# Patient Record
Sex: FEMALE | Race: WHITE | Employment: FULL TIME | URBAN - METROPOLITAN AREA
[De-identification: names, ages, dates, MRNs, and addresses within clinical notes are randomized per-mention and may not be internally consistent; named-entity substitution may affect disease eponyms.]

---

## 2018-06-25 ENCOUNTER — OFFICE VISIT (OUTPATIENT)
Dept: URGENT CARE | Facility: URGENT CARE | Age: 38
End: 2018-06-25
Payer: COMMERCIAL

## 2018-06-25 VITALS
TEMPERATURE: 98.2 F | SYSTOLIC BLOOD PRESSURE: 116 MMHG | HEART RATE: 58 BPM | OXYGEN SATURATION: 99 % | DIASTOLIC BLOOD PRESSURE: 81 MMHG

## 2018-06-25 DIAGNOSIS — L50.3 URTICARIA, DERMATOGRAPHIC: Primary | ICD-10-CM

## 2018-06-25 DIAGNOSIS — Z88.9 DRUG ALLERGY: ICD-10-CM

## 2018-06-25 PROCEDURE — 99203 OFFICE O/P NEW LOW 30 MIN: CPT | Performed by: FAMILY MEDICINE

## 2018-06-25 RX ORDER — PREDNISONE 20 MG/1
40 TABLET ORAL DAILY
Qty: 14 TABLET | Refills: 0 | Status: SHIPPED | OUTPATIENT
Start: 2018-06-25 | End: 2018-07-02

## 2018-06-25 NOTE — PROGRESS NOTES
SUBJECTIVE:  Deborah Wiseman is a 37 year old female who presents to the clinic today for a rash.  Onset of rash was 3 days ago.   Rash is worsening.  Location of the rash: neck, ears and arms and legs.  Quality/symptoms of rash: itching and red   Symptoms are mild and moderate and rash seems to be worsening.  Previous history of a similar rash? Yes: had a similar rash on her legs noticed about 2 weeks ago after shaving but it was localized to her legs only.  Went for a consult on 6-20-18 and started on Keflex for folliculitis.  Felt like the leg rash was improving only to spread in the past few days.   Recent exposure history: Keflex started 6-2-18    Associated symptoms include: nothing.  Has been taking 2 daily Zyrtec but not better.     Past Medical History:   Diagnosis Date     Abnormal Pap smear 4 yrs ago      Current Outpatient Prescriptions   Medication Sig Dispense Refill     Prenatal Vit w/Mm-Tesavjjaj-BT (PNV PO) Take 1 tablet by mouth daily.       Social History   Substance Use Topics     Smoking status: Never Smoker     Smokeless tobacco: Never Used     Alcohol use No       ROS:  Review of systems negative except as stated above.    EXAM:   /81  Pulse 58  Temp 98.2  F (36.8  C) (Oral)  SpO2 99%  Breastfeeding? No  GENERAL: alert, no acute distress.  SKIN: Rash description:    Distribution: generalized  Location: ears, neck, arms and legs, dermatographism noted on abdomen    Color: red,  Lesion type: maculopapular, small wheals, scattered discrete lesions with no other findings  HENT: ear canals and TM's normal.  Nose and mouth without ulcers, erythema or lesions  NECK: supple, non-tender to palpation, no adenopathy noted  RESP: lungs clear to auscultation - no rales, rhonchi or wheezes  CV: regular rates and rhythm, normal S1 S2, no murmur noted    ASSESSMENT:  Urticaria, dermatographic  Drug allergy    PLAN:  1) See today's orders.  Recommend increase Zyrtec to 20 mg daily and use Benadryl 1-2  at night prn itching/rash.   2)  She is to stop the Keflex as I believe is the etiology of the urticaria.  3) Follow-up with primary clinic if not improving in the next few days or sooner if worsening.    Marilee Porter MD

## 2018-06-25 NOTE — MR AVS SNAPSHOT
After Visit Summary   6/25/2018    Deborah Wiseman    MRN: [id number]           Patient Information     Date Of Birth          1980        Visit Information        Provider Department      6/25/2018 5:15 PM Marilee Fisher MD [facility]        Today's Diagnoses     Urticaria, dermatographic    -  1      Care Instructions      Hives (Adult)  Hives are pink or red bumps on the skin. These bumps are also known as wheals. The bumps can itch, burn, or sting. Hives can occur anywhere on the body. They vary in size and shape and can form in clusters. Individual hives can appear and go away quickly. New hives may develop as old ones fade. Hives are common and usually harmless. Occasionally hives are a sign of a serious allergy.  Hives are often caused by an allergic reaction. It may be an allergic reaction to foods such as fruit, shellfish, chocolate, nuts, or tomatoes. It may be a reaction to pollens, animal fur, or mold spores. Medicines, chemicals, and insect bites can also cause hives. And hives can be caused by hot sun or cold air. The cause of hives can be difficult to find.  You may be given medicines to relieve swelling and itching. Follow all instructions when using these medicines. The hives will usually fade in a few days, but can last up to 2 weeks.  Home care  Follow these tips:    Try to find the cause of the hives and eliminate it. Discuss possible causes with your healthcare provider. Future reactions to the same allergen may be worse.    Don t scratch the hives. Scratching will delay healing. To reduce itching, apply cool, wet compresses to the skin.    Dress in soft, loose cotton clothing.    Don t bathe in hot water. This can make the itching worse.    Apply an ice pack or cool pack wrapped in a thin towel to your skin. This will help reduce redness and itching. But if your hives were caused by exposure to cold, then do not apply more cold to  them.    You may use over-the counter antihistamines to reduce itching. Some older antihistamines, such as diphenhydramine and chlorpheniramine, are inexpensive. But they need to be taken often and may make you sleepy. They are best used at bedtime. Don t use diphenhydramine if you have glaucoma or have trouble urinating because of an enlarged prostate. Newer antihistamines, such as loratadine, cetirizine, and fexofenadine, are generally more expensive. But they tend to have fewer side effects, such as drowsiness. They can be taken less often.    Another type of antihistamine is used to treat heartburn. This type includes ranitidine, nizatidine, famotidine, and cimetidine. These are sometimes used along with the above antihistamines if a single medicine is not working.  Follow-up care  Follow up with your healthcare provider if your symptoms don't get better in 2 days. Ask your provider about allergy testing if you have had a severe reaction, or have had several episodes of hives. He or she can use the allergy testing to find out what you are allergic to.  When to seek medical advice  Call your healthcare provider right away if any of these occur:    Fever of 100.4 F (38.0 C) or higher, or as directed by your healthcare provider    Redness, swelling, or pain    Foul-smelling fluid coming from the rash  Call 911  Call 911 if any of the following occur:    Swelling of the face, throat, or tongue    Trouble breathing or swallowing    Dizziness, weakness, or fainting  Date Last Reviewed: 9/1/2016 2000-2017 [employer]. [address] [place] [address]. All rights reserved. This information is not intended as a substitute for professional medical care. Always follow your healthcare professional's instructions.                Follow-ups after your visit        Follow-up notes from your care team     Return if symptoms worsen or fail to improve.      Who to contact     If you have questions or  "need follow up information about today's clinic visit or your schedule please contact [facility] directly at [phone].  Normal or non-critical lab and imaging results will be communicated to you by MyChart, letter or phone within 4 business days after the clinic has received the results. If you do not hear from us within 7 days, please contact the clinic through [facility] or phone. If you have a critical or abnormal lab result, we will notify you by phone as soon as possible.  Submit refill requests through [facility] or call your pharmacy and they will forward the refill request to us. Please allow 3 business days for your refill to be completed.          Additional Information About Your Visit        [facility] Information     [facility] lets you send messages to your doctor, view your test results, renew your prescriptions, schedule appointments and more. To sign up, go to [email or web address] . Click on \"Log in\" on the left side of the screen, which will take you to the Welcome page. Then click on \"Sign up Now\" on the right side of the page.     You will be asked to enter the access code listed below, as well as some personal information. Please follow the directions to create your username and password.     Your access code is: [email or web address]  Expires: 2018  5:50 PM     Your access code will  in 90 days. If you need help or a new code, please call your [place] clinic or [phone].        Care EveryWhere ID     This is your Care EveryWhere ID. This could be used by other organizations to access your [place] medical records  [id number]        Your Vitals Were     Pulse Temperature Pulse Oximetry Breastfeeding?          58 98.2  F (36.8  C) (Oral) 99% No         Blood Pressure from Last 3 Encounters:   18 116/81   12 111/77   11 115/77    Weight from Last 3 Encounters:   12 167 lb 11.2 oz (76.1 kg)              Today, you had the following     No orders " found for display         Today's Medication Changes          These changes are accurate as of 6/25/18  5:50 PM.  If you have any questions, ask your nurse or doctor.               Start taking these medicines.        Dose/Directions    predniSONE 20 MG tablet   Commonly known as:  DELTASONE   Used for:  Urticaria, dermatographic   Started by:  Marilee Fisher MD        Dose:  40 mg   Take 2 tablets (40 mg) by mouth daily for 7 days   Quantity:  14 tablet   Refills:  0            Where to get your medicines      These medications were sent to [facility] - [place], [place] - [address]  [address], [place] [address]     Phone:  [phone]     predniSONE 20 MG tablet                Primary Care Provider Office Phone # Fax #    Carley Nely Barnett [phone]       [facility] [address]  [facility] [address]        Equal Access to Services     [facility] AH: Hadii aad ku hadasho Soomaali, waaxda luqadaha, qaybta kaalmada adeegyada, waxay idiin hayaan amanda chen . So [facility] [phone].    ATENCIÓN: Si habla español, tiene a benjamin disposición servicios gratuitos de asistencia lingüística. Jairo al [phone].    We comply with applicable federal civil rights laws and [place] laws. We do not discriminate on the basis of race, color, national origin, age, disability, sex, sexual orientation, or gender identity.            Thank you!     Thank you for choosing [facility]  for your care. Our goal is always to provide you with excellent care. Hearing back from our patients is one way we can continue to improve our services. Please take a few minutes to complete the written survey that you may receive in the mail after your visit with us. Thank you!             Your Updated Medication List - Protect others around you: Learn how to safely use, store and throw away your medicines at www.disposemymeds.org.          This list is  accurate as of 6/25/18  5:50 PM.  Always use your most recent med list.                   Brand Name Dispense Instructions for use Diagnosis    PNV PO      Take 1 tablet by mouth daily.        predniSONE 20 MG tablet    DELTASONE    14 tablet    Take 2 tablets (40 mg) by mouth daily for 7 days    Urticaria, dermatographic

## 2018-06-25 NOTE — PATIENT INSTRUCTIONS
Hives (Adult)  Hives are pink or red bumps on the skin. These bumps are also known as wheals. The bumps can itch, burn, or sting. Hives can occur anywhere on the body. They vary in size and shape and can form in clusters. Individual hives can appear and go away quickly. New hives may develop as old ones fade. Hives are common and usually harmless. Occasionally hives are a sign of a serious allergy.  Hives are often caused by an allergic reaction. It may be an allergic reaction to foods such as fruit, shellfish, chocolate, nuts, or tomatoes. It may be a reaction to pollens, animal fur, or mold spores. Medicines, chemicals, and insect bites can also cause hives. And hives can be caused by hot sun or cold air. The cause of hives can be difficult to find.  You may be given medicines to relieve swelling and itching. Follow all instructions when using these medicines. The hives will usually fade in a few days, but can last up to 2 weeks.  Home care  Follow these tips:    Try to find the cause of the hives and eliminate it. Discuss possible causes with your healthcare provider. Future reactions to the same allergen may be worse.    Don t scratch the hives. Scratching will delay healing. To reduce itching, apply cool, wet compresses to the skin.    Dress in soft, loose cotton clothing.    Don t bathe in hot water. This can make the itching worse.    Apply an ice pack or cool pack wrapped in a thin towel to your skin. This will help reduce redness and itching. But if your hives were caused by exposure to cold, then do not apply more cold to them.    You may use over-the counter antihistamines to reduce itching. Some older antihistamines, such as diphenhydramine and chlorpheniramine, are inexpensive. But they need to be taken often and may make you sleepy. They are best used at bedtime. Don t use diphenhydramine if you have glaucoma or have trouble urinating because of an enlarged prostate. Newer antihistamines, such as  loratadine, cetirizine, and fexofenadine, are generally more expensive. But they tend to have fewer side effects, such as drowsiness. They can be taken less often.    Another type of antihistamine is used to treat heartburn. This type includes ranitidine, nizatidine, famotidine, and cimetidine. These are sometimes used along with the above antihistamines if a single medicine is not working.  Follow-up care  Follow up with your healthcare provider if your symptoms don't get better in 2 days. Ask your provider about allergy testing if you have had a severe reaction, or have had several episodes of hives. He or she can use the allergy testing to find out what you are allergic to.  When to seek medical advice  Call your healthcare provider right away if any of these occur:    Fever of 100.4 F (38.0 C) or higher, or as directed by your healthcare provider    Redness, swelling, or pain    Foul-smelling fluid coming from the rash  Call 911  Call 911 if any of the following occur:    Swelling of the face, throat, or tongue    Trouble breathing or swallowing    Dizziness, weakness, or fainting  Date Last Reviewed: 9/1/2016 2000-2017 [employer]. [address] [place] [address], [place] [address]. All rights reserved. This information is not intended as a substitute for professional medical care. Always follow your healthcare professional's instructions.

## 2023-10-11 ENCOUNTER — VIRTUAL VISIT (OUTPATIENT)
Dept: INTERNAL MEDICINE | Facility: CLINIC | Age: 43
End: 2023-10-11
Payer: COMMERCIAL

## 2023-10-11 DIAGNOSIS — Z00.00 ENCOUNTER FOR PREVENTIVE HEALTH EXAMINATION: Primary | ICD-10-CM

## 2023-10-11 PROCEDURE — 99207 PR NO CHARGE LOS: CPT

## 2023-10-11 RX ORDER — NORETHINDRONE ACETATE AND ETHINYL ESTRADIOL 1.5; 3 MG/1; UG/1
TABLET ORAL
COMMUNITY
Start: 2023-09-17

## 2023-10-11 RX ORDER — ESCITALOPRAM OXALATE 10 MG/1
1 TABLET ORAL
COMMUNITY
Start: 2023-08-07

## 2023-10-11 NOTE — PROGRESS NOTES
Health Maintenance:  Do you have a PCP? No  When was your last visit with your PCP?   When was your last eye exam? 12/21  Have you ever had a colonoscopy? Yes   If yes, when? 2022  Have you ever had any polyps removed? Yes     If Female: (25 years old+) When was your last pap smear ? 2021   Have you ever had abnormal pap smear results? Yes 10+ years    As part of your visit we will set up a DEXA scan which will measure your body composition. We have a few questions that need to be answered before we can schedule this scan:   What is your approximate weight? 140   Have you ever had a DEXA scan within the past 2 years? No   Will you have any other imaging studies with contrast (x-ray, CT scan) within 7 days of this appointment? No   Have you had any spine or hip surgery? No   Do you take any vitamins that contain calcium or antacids with calcium? Yes    If yes, stop taking 24 hours prior to visit.     Goals for the Visit:  Thorough Comprehensive Preventive Exam  Sleep Concerns, fatigue  Skin Concerns  Pertinent past Medical/Family and Social HX:   Pertinent sx that desire are addressed with this visit:     Instructions prior to appointment:   1. Fast beginning at 10 pm for lab appointment  2. If your preventive care assessment package includes a Fitness Assessment, please bring athletic shoes. Complementary [facility] fitness attire is provided and yours to keep.  3. If eye exam, eyes may be dilated, it will last 4-6 hours, may want to bring sunglasses.   4. May bring laptop or other work materials for use during downtime.   5. You will receive an email about 3 days prior to your visit with a final itinerary, menu selections for the complementary breakfast and lunch and instructions for the visit.     Complimentary  Parking provided. Drop off car in front of [facility], take the patient elevators to the [address] [place] [address]. When you enter in the lobby, identify  yourself as an [facility] [atient and you will be escorted up to the clinic.   If questions arise prior to your appointment please contact the clinic at [phone].

## 2023-10-16 ENCOUNTER — OFFICE VISIT (OUTPATIENT)
Dept: GASTROENTEROLOGY | Facility: CLINIC | Age: 43
End: 2023-10-16

## 2023-10-16 ENCOUNTER — APPOINTMENT (OUTPATIENT)
Dept: INTERNAL MEDICINE | Facility: CLINIC | Age: 43
End: 2023-10-16

## 2023-10-16 ENCOUNTER — OFFICE VISIT (OUTPATIENT)
Dept: AUDIOLOGY | Facility: CLINIC | Age: 43
End: 2023-10-16
Attending: INTERNAL MEDICINE

## 2023-10-16 ENCOUNTER — OFFICE VISIT (OUTPATIENT)
Dept: PULMONOLOGY | Facility: CLINIC | Age: 43
End: 2023-10-16

## 2023-10-16 ENCOUNTER — OFFICE VISIT (OUTPATIENT)
Dept: OPHTHALMOLOGY | Facility: CLINIC | Age: 43
End: 2023-10-16

## 2023-10-16 ENCOUNTER — OFFICE VISIT (OUTPATIENT)
Dept: DERMATOLOGY | Facility: CLINIC | Age: 43
End: 2023-10-16

## 2023-10-16 ENCOUNTER — OFFICE VISIT (OUTPATIENT)
Dept: INTERNAL MEDICINE | Facility: CLINIC | Age: 43
End: 2023-10-16

## 2023-10-16 ENCOUNTER — ANCILLARY PROCEDURE (OUTPATIENT)
Dept: BONE DENSITY | Facility: CLINIC | Age: 43
End: 2023-10-16

## 2023-10-16 VITALS
HEART RATE: 77 BPM | HEIGHT: 68 IN | OXYGEN SATURATION: 100 % | SYSTOLIC BLOOD PRESSURE: 114 MMHG | TEMPERATURE: 98.6 F | DIASTOLIC BLOOD PRESSURE: 79 MMHG | BODY MASS INDEX: 21.82 KG/M2 | WEIGHT: 144 LBS | RESPIRATION RATE: 16 BRPM

## 2023-10-16 DIAGNOSIS — Z00.00 ENCOUNTER FOR PREVENTIVE HEALTH EXAMINATION: ICD-10-CM

## 2023-10-16 DIAGNOSIS — Z00.00 ENCOUNTER FOR PREVENTIVE HEALTH EXAMINATION: Primary | ICD-10-CM

## 2023-10-16 DIAGNOSIS — L81.4 SOLAR LENTIGINOSIS: ICD-10-CM

## 2023-10-16 DIAGNOSIS — D22.9 MULTIPLE PIGMENTED NEVI: ICD-10-CM

## 2023-10-16 DIAGNOSIS — L82.1 SEBORRHEIC KERATOSES: ICD-10-CM

## 2023-10-16 DIAGNOSIS — R06.83 SNORING: ICD-10-CM

## 2023-10-16 DIAGNOSIS — D18.01 CHERRY ANGIOMA: ICD-10-CM

## 2023-10-16 DIAGNOSIS — Z97.3 WEARS CONTACT LENSES: ICD-10-CM

## 2023-10-16 DIAGNOSIS — M54.50 BILATERAL LOW BACK PAIN WITHOUT SCIATICA, UNSPECIFIED CHRONICITY: ICD-10-CM

## 2023-10-16 DIAGNOSIS — Z71.3 NUTRITIONAL COUNSELING: Primary | ICD-10-CM

## 2023-10-16 DIAGNOSIS — Z71.82 EXERCISE COUNSELING: Primary | ICD-10-CM

## 2023-10-16 DIAGNOSIS — H52.13 SEVERE MYOPIA OF BOTH EYES: Primary | ICD-10-CM

## 2023-10-16 DIAGNOSIS — Z12.83 SKIN CANCER SCREENING: Primary | ICD-10-CM

## 2023-10-16 DIAGNOSIS — Z01.10 HEARING WITHIN NORMAL LIMITS IN BOTH EARS: Primary | ICD-10-CM

## 2023-10-16 DIAGNOSIS — R53.82 CHRONIC FATIGUE: ICD-10-CM

## 2023-10-16 LAB
ALP SERPL-CCNC: 35 U/L (ref 35–104)
ALT SERPL W P-5'-P-CCNC: 7 U/L (ref 0–50)
ATRIAL RATE - MUSE: 80 BPM
BASO+EOS+MONOS # BLD AUTO: NORMAL 10*3/UL
BASO+EOS+MONOS NFR BLD AUTO: NORMAL %
BASOPHILS # BLD AUTO: 0.1 10E3/UL (ref 0–0.2)
BASOPHILS NFR BLD AUTO: 1 %
CHOLEST SERPL-MCNC: 206 MG/DL
CREAT SERPL-MCNC: 0.86 MG/DL (ref 0.51–0.95)
DIASTOLIC BLOOD PRESSURE - MUSE: NORMAL MMHG
EGFRCR SERPLBLD CKD-EPI 2021: 86 ML/MIN/1.73M2
EOSINOPHIL # BLD AUTO: 0.1 10E3/UL (ref 0–0.7)
EOSINOPHIL NFR BLD AUTO: 3 %
ERYTHROCYTE [DISTWIDTH] IN BLOOD BY AUTOMATED COUNT: 12.2 % (ref 10–15)
FASTING STATUS PATIENT QL REPORTED: YES
GLUCOSE SERPL-MCNC: 85 MG/DL (ref 70–99)
HCT VFR BLD AUTO: 39.2 % (ref 35–47)
HCV AB SERPL QL IA: NONREACTIVE
HDLC SERPL-MCNC: 67 MG/DL
HGB BLD-MCNC: 13 G/DL (ref 11.7–15.7)
HIV 1+2 AB+HIV1 P24 AG SERPL QL IA: NONREACTIVE
HOLD SPECIMEN: NORMAL
HOLD SPECIMEN: NORMAL
IMM GRANULOCYTES # BLD: 0 10E3/UL
IMM GRANULOCYTES NFR BLD: 0 %
INTERPRETATION ECG - MUSE: NORMAL
LDLC SERPL CALC-MCNC: 123 MG/DL
LYMPHOCYTES # BLD AUTO: 1.7 10E3/UL (ref 0.8–5.3)
LYMPHOCYTES NFR BLD AUTO: 35 %
MCH RBC QN AUTO: 30.4 PG (ref 26.5–33)
MCHC RBC AUTO-ENTMCNC: 33.2 G/DL (ref 31.5–36.5)
MCV RBC AUTO: 92 FL (ref 78–100)
MONOCYTES # BLD AUTO: 0.4 10E3/UL (ref 0–1.3)
MONOCYTES NFR BLD AUTO: 8 %
NEUTROPHILS # BLD AUTO: 2.5 10E3/UL (ref 1.6–8.3)
NEUTROPHILS NFR BLD AUTO: 53 %
NONHDLC SERPL-MCNC: 139 MG/DL
NRBC # BLD AUTO: 0 10E3/UL
NRBC BLD AUTO-RTO: 0 /100
P AXIS - MUSE: 42 DEGREES
PLATELET # BLD AUTO: 222 10E3/UL (ref 150–450)
PR INTERVAL - MUSE: 114 MS
QRS DURATION - MUSE: 82 MS
QT - MUSE: 386 MS
QTC - MUSE: 445 MS
R AXIS - MUSE: 53 DEGREES
RBC # BLD AUTO: 4.28 10E6/UL (ref 3.8–5.2)
SYSTOLIC BLOOD PRESSURE - MUSE: NORMAL MMHG
T AXIS - MUSE: 48 DEGREES
TRIGL SERPL-MCNC: 81 MG/DL
TSH SERPL DL<=0.005 MIU/L-ACNC: 1.88 UIU/ML (ref 0.3–4.2)
VENTRICULAR RATE- MUSE: 80 BPM
VIT D+METAB SERPL-MCNC: 50 NG/ML (ref 20–50)
WBC # BLD AUTO: 4.8 10E3/UL (ref 4–11)

## 2023-10-16 PROCEDURE — 99000 SPECIMEN HANDLING OFFICE-LAB: CPT | Performed by: PATHOLOGY

## 2023-10-16 PROCEDURE — 87389 HIV-1 AG W/HIV-1&-2 AB AG IA: CPT | Performed by: INTERNAL MEDICINE

## 2023-10-16 PROCEDURE — 36415 COLL VENOUS BLD VENIPUNCTURE: CPT | Performed by: PATHOLOGY

## 2023-10-16 PROCEDURE — 90686 IIV4 VACC NO PRSV 0.5 ML IM: CPT | Performed by: INTERNAL MEDICINE

## 2023-10-16 PROCEDURE — 99207 PR NO BILLABLE SERVICE THIS VISIT: CPT | Performed by: DIETITIAN, REGISTERED

## 2023-10-16 PROCEDURE — 92557 COMPREHENSIVE HEARING TEST: CPT | Performed by: AUDIOLOGIST

## 2023-10-16 PROCEDURE — 99207 PR NO CHARGE LOS: CPT

## 2023-10-16 PROCEDURE — 99203 OFFICE O/P NEW LOW 30 MIN: CPT | Performed by: PHYSICIAN ASSISTANT

## 2023-10-16 PROCEDURE — 91320 SARSCV2 VAC 30MCG TRS-SUC IM: CPT | Performed by: INTERNAL MEDICINE

## 2023-10-16 PROCEDURE — 96999 UNLISTED SPEC DERM SVC/PX: CPT | Performed by: INTERNAL MEDICINE

## 2023-10-16 PROCEDURE — 77080 DXA BONE DENSITY AXIAL: CPT | Performed by: INTERNAL MEDICINE

## 2023-10-16 PROCEDURE — 90480 ADMN SARSCOV2 VAC 1/ONLY CMP: CPT | Performed by: INTERNAL MEDICINE

## 2023-10-16 PROCEDURE — 85025 COMPLETE CBC W/AUTO DIFF WBC: CPT | Performed by: PATHOLOGY

## 2023-10-16 PROCEDURE — 99386 PREV VISIT NEW AGE 40-64: CPT | Mod: 25 | Performed by: INTERNAL MEDICINE

## 2023-10-16 PROCEDURE — 86803 HEPATITIS C AB TEST: CPT | Performed by: INTERNAL MEDICINE

## 2023-10-16 PROCEDURE — 92550 TYMPANOMETRY & REFLEX THRESH: CPT | Performed by: AUDIOLOGIST

## 2023-10-16 PROCEDURE — 86706 HEP B SURFACE ANTIBODY: CPT | Performed by: INTERNAL MEDICINE

## 2023-10-16 PROCEDURE — 92004 COMPRE OPH EXAM NEW PT 1/>: CPT | Performed by: OPHTHALMOLOGY

## 2023-10-16 PROCEDURE — 93000 ELECTROCARDIOGRAM COMPLETE: CPT | Performed by: INTERNAL MEDICINE

## 2023-10-16 PROCEDURE — 80061 LIPID PANEL: CPT | Performed by: PATHOLOGY

## 2023-10-16 PROCEDURE — 82306 VITAMIN D 25 HYDROXY: CPT | Performed by: INTERNAL MEDICINE

## 2023-10-16 PROCEDURE — 82565 ASSAY OF CREATININE: CPT | Performed by: PATHOLOGY

## 2023-10-16 PROCEDURE — 82947 ASSAY GLUCOSE BLOOD QUANT: CPT | Performed by: PATHOLOGY

## 2023-10-16 PROCEDURE — 84443 ASSAY THYROID STIM HORMONE: CPT | Performed by: PATHOLOGY

## 2023-10-16 PROCEDURE — 84460 ALANINE AMINO (ALT) (SGPT): CPT | Performed by: PATHOLOGY

## 2023-10-16 PROCEDURE — 90471 IMMUNIZATION ADMIN: CPT | Performed by: INTERNAL MEDICINE

## 2023-10-16 PROCEDURE — 92015 DETERMINE REFRACTIVE STATE: CPT | Performed by: OPHTHALMOLOGY

## 2023-10-16 PROCEDURE — 94375 RESPIRATORY FLOW VOLUME LOOP: CPT | Performed by: INTERNAL MEDICINE

## 2023-10-16 PROCEDURE — 84075 ASSAY ALKALINE PHOSPHATASE: CPT | Performed by: PATHOLOGY

## 2023-10-16 ASSESSMENT — REFRACTION_MANIFEST
OS_SPHERE: -6.75
OD_SPHERE: -6.75
OD_CYLINDER: SPHERE
OS_CYLINDER: SPHERE

## 2023-10-16 ASSESSMENT — CONF VISUAL FIELD
OS_SUPERIOR_NASAL_RESTRICTION: 0
OS_INFERIOR_NASAL_RESTRICTION: 0
METHOD: COUNTING FINGERS
OD_INFERIOR_TEMPORAL_RESTRICTION: 0
OD_SUPERIOR_NASAL_RESTRICTION: 0
OD_INFERIOR_NASAL_RESTRICTION: 0
OD_NORMAL: 1
OS_SUPERIOR_TEMPORAL_RESTRICTION: 0
OD_SUPERIOR_TEMPORAL_RESTRICTION: 0
OS_NORMAL: 1
OS_INFERIOR_TEMPORAL_RESTRICTION: 0

## 2023-10-16 ASSESSMENT — REFRACTION_CURRENTRX
OD_SPHERE: -6.00
OS_BRAND: ALCON DAILIES TOTAL 1 BC 8.5 D 14.1
OS_DIAMETER: 14.1
OS_BASECURVE: 8.5
OD_BRAND: ALCON DAILIES TOTAL 1 BC 8.5 D 14.1
OD_BASECURVE: 8.5
OD_DIAMETER: 14.1
OS_SPHERE: -6.00

## 2023-10-16 ASSESSMENT — EXTERNAL EXAM - RIGHT EYE: OD_EXAM: NORMAL

## 2023-10-16 ASSESSMENT — CUP TO DISC RATIO
OD_RATIO: 0.2
OS_RATIO: 0.2

## 2023-10-16 ASSESSMENT — PAIN SCALES - GENERAL: PAINLEVEL: NO PAIN (0)

## 2023-10-16 ASSESSMENT — TONOMETRY
OS_IOP_MMHG: 9
IOP_METHOD: ICARE
OD_IOP_MMHG: 10

## 2023-10-16 ASSESSMENT — SLIT LAMP EXAM - LIDS
COMMENTS: NORMAL
COMMENTS: NORMAL

## 2023-10-16 ASSESSMENT — VISUAL ACUITY
CORRECTION_TYPE: CONTACTS
OS_CC: 20/20
METHOD: SNELLEN - LINEAR
OD_CC: 20/20

## 2023-10-16 ASSESSMENT — EXTERNAL EXAM - LEFT EYE: OS_EXAM: NORMAL

## 2023-10-16 NOTE — PROGRESS NOTES
"[facility] Outpatient Medical Nutrition Therapy      Time Spent:  40 minutes  Session Type:  Initial   Referral Source: medidametrics Package/Dr. Rene Boateng  Reason for RD Visit:   Nutritional counseling     Nutrition Assessment:    Patient is a 42 y.o. female who is here for initial annual visit with Registered Dietitian (RD).      She stated that overall she is interested in general healthful diet tips.  She stated that she reads many different nutrition recommendations and trying to figure out what she should eat and what advice to follow for general healthful diet tips.  She stated that she has history of polyps so has to have a colonoscopy every few years.  She stated that she is not good at meal prepping ahead of time or planning out meals so more interested in other tips ideas for eating healthy balanced meals without a lot of preparation ahead of time.  She eats 2-3 meals per day and will have several snacks.  Drinks water, coffee with honey and milk, tea with honey and milk and she will either have a probiotic juice drink or combo gel which she alternates between the 2 every other day.    Estimated body mass index is 21.9 kg/m  as calculated from the following:    Height as of an earlier encounter on 10/16/23: 1.727 m (5' 8\").    Weight as of an earlier encounter on 10/16/23: 65.3 kg (144 lb).    Diet Recall:  (some usual meals, snacks and beverages):  Meal Food    Breakfast About half the week will skip breakfast and eat a brunch/first meal at lunch.  If eating has eggs and sausage or just eggs or Dontae 4 ounce yogurt or cheese and salami or eggs and occasionally with sourdough toast   Lunch Smoothie made with fruit, avocado, milk and water (sometimes adds protein powder into the small knee) or salad with leftover chicken or dinner leftovers or just an apple and peanut butter    Dinner Varies a lot: Tacos or spaghetti with tomatoes and peppers and garlic bread or grilled chicken kebab with " "vegetable or stirfry with meat, vegetable and rice or chicken chili or eat/takeout meal such as burger and fries or salad or sandwich   Snacks Monster trail mix or Doritos or apple and peanut butter or air fried sweet potatoes or cheese and crackers or buffalo dip with crackers   Beverages 40 ounces water, 1 cup coffee with milk and honey 1 cup black or green tea with milk and honey, either kombucha or probiotic \"pressed juice and sparkling water   Alcohol Intake About 2-3 times per month will have 1-2 mixed drinks     Frequency of eating/taking out meals: About 1-2 times per week but if having a lot of sports for son then could be more.  Tends to have either salad or sandwich or burger with fries when eating out     Labs:  on 10/16/23: chol 206, LDL chol 123 and non HDL chol 139.    Last Comprehensive Metabolic Panel:  Glucose   Date Value Ref Range Status   10/16/2023 85 70 - 99 mg/dL Final     Creatinine   Date Value Ref Range Status   10/16/2023 0.86 0.51 - 0.95 mg/dL Final     GFR Estimate   Date Value Ref Range Status   10/16/2023 86 >60 mL/min/1.73m2 Final       CBC RESULTS:   Recent Labs   Lab Test 10/16/23  0751   WBC 4.8   RBC 4.28   HGB 13.0   HCT 39.2   MCV 92   MCH 30.4   MCHC 33.2   RDW 12.2          Pertinent Medications/vitamin and mineral supplements:     Current Outpatient Medications   Medication    escitalopram (LEXAPRO) 10 MG tablet    JUNEL 1.5/30 1.5-30 MG-MCG tablet    Prenatal Vit w/Mt-Lzsoxlieb-HL (PNV PO)     No current facility-administered medications for this visit.       Food Allergies: No known food allergies     Physical Activity: Walks daily for 2 miles, 1-2 times per week uses either palatine or takes a class and does weights    Nutrition Prescription: Recommended general healthful diet     Nutrition Intervention:    Nutrition Education/Counseling:  -Provided general healthful diet nutrition education with tips and suggestions.   -Reviewed the Plate method meal plan with " food groups and some example foods in groups.   -Reviewed the difference between unsaturated and saturated fats with recommendation to aim for choosing unsaturated fat over saturated fats and discussed examples of both.  -Discussed adequate hydration/recommendations and encouraged pt to drink at least 48 oz-64 oz (6-8 cups) per day.   Answered patient's questions. Patient verbalized understanding of education provided. Provided pt with RD contact information and list of goals below.     Educational Materials Provided:  [employer] Daily Nutrition Plate method handout     Goals:    Aim for eating 3 balanced meals per day and snacks in between meals if going long stretches between meals and if hungry.    Can use the Plate method plan for general guidance on getting balanced meals and general portion sizes which is as follows:   Make half of your plate vegetables and fruit. (Aim for at least 1-2 cups or more of vegetables and/or fruit at each meal).     Make 1/4 of your plate lean protein sources at a meal (salmon/fish, skinless chicken/turkey breast, pork loin, lean cuts of beef, black or kidney or alejandra beans, tofu, edamame, tempeh, eggs, egg whites, lowfat cottage cheese, lowfat yogurt).   Make the other 1/4 of your plate whole grain starches/grains/starchy vegetables at meals. Some examples include quinoa, brown rice, wild rice, barley, whole grain tortilla or starchy vegetables (potatoes, sweet potatoes, winter squash, peas, corn).     Include some healthy/unsaturated fat servings at a meal (avocados, nut butters, nuts/seeds, olive oil, vegetable oils, flaxseed, braulio seeds).     *Note: Recommendation is to eat at least 2-3 serving per day of some good sources of calcium (such as lowfat cottage cheese, lowfat yogurt, lowfat milk, tofu, salmon, sardines, kale, spinach, soybeans, almonds or whey protein powder for example).    3.  Prepare/plan out some meals ahead of time.   --To make planning ahead easier, you can make  "extra servings at dinner, so you can have leftovers for lunch.   --Can buy vegetables already cut/shredded/washed to quickly put a salad together for a meal or to go along with a meal.  --can make extra servings of lean meat/chicken breast to add to your salad at a meal.   --can have frozen vegetables, that you can easily microwave to add to meals/to add extra servings to meals.  --[employer] (has many easy to prepare recipes under the \"Simple Factor\" recipes (may be 3 ingredients or less, 10 ingredients or less, one pot dishes as some examples).    4.  Gradually increase the amount of fiber you eat to get to a goal of 25 grams-35 grams per day.    --When increasing fiber in diet, do so gradually and do not eat too much fiber all of a sudden and also try not to load up on a lot of fiber all at one meal.    --To increase fiber:   - choose whole grain/100% whole grain grains and whole grain bread, whole grain crackers, whole grain pastas.   - eat fruits and vegetables with skins.   - eat whole fresh or frozen vegetables and fruit not juices.   - can add some beans or peas into soup, salads, stews.   - can eat nuts as a snack or along with a meal.   - can sprinkle nuts /seeds onto salad   - can add some braulio seeds or flaxseeds into homemade smoothies/protein smoothies    5.  Drink at least 8 cups (64 oz) or more of fluids especially from water per day. You may need to drink more fluids especially with eating a higher fiber diet as fluids help your body process the fiber with less discomfort.    Nutrition Monitoring and Evaluation: Will monitor adherence to nutrition recommendations at future RD visits.     Further Medical Nutrition Therapy:  Annual visits    Patient was encouraged to call/contact RD with any further questions.    Karla Gallardo, MS, RD, LD            "

## 2023-10-16 NOTE — PROGRESS NOTES
Deborah Wiseman comes into clinic today at the request of Dr. DONALD Boateng Ordering Provider for EKG.    This service provided today was under the supervising provider of the day Dr. DONALD Boateng, who was available if needed.    Wilma Kathleen, [facility]

## 2023-10-16 NOTE — PATIENT INSTRUCTIONS

## 2023-10-16 NOTE — LETTER
"    10/16/2023         RE: Deborah Wiseman  [address]  [facility] [address]        Dear Colleague,    Thank you for referring your patient, Deborah Wiseman, to the [facility] [place]. Please see a copy of my visit note below.    [facility]      Time Spent:  40 minutes  Session Type:  Initial   Referral Source: [facility]/Dr. Rene Boateng  Reason for RD Visit:   Nutritional counseling     Nutrition Assessment:     Patient is a 42 y.o. female who is here for initial annual visit with Registered Dietitian (RD).    There is no problem list on file for this patient.      Estimated body mass index is 21.9 kg/m  as calculated from the following:    Height as of an earlier encounter on 10/16/23: 1.727 m (5' 8\").    Weight as of an earlier encounter on 10/16/23: 65.3 kg (144 lb).    Diet Recall:  (some usual meals, snacks and beverages):  Meal Food    Breakfast About half the week will skip breakfast and eat a brunch/first meal at lunch.  If eating has eggs and sausage or just eggs or Dontae 4 ounce yogurt or cheese and salami or eggs and occasionally with sourdough toast   Lunch Smoothie made with fruit, avocado, milk and water (sometimes adds protein powder into the small knee) or salad with leftover chicken or dinner leftovers or just an apple and peanut butter    Dinner Varies a lot: Tacos or spaghetti with tomatoes and peppers and garlic bread or grilled chicken kebab with vegetable or stirfry with meat, vegetable and rice or chicken chili or eat/takeout meal such as burger and fries or salad or sandwich   Snacks Monster trail mix or Doritos or apple and peanut butter or air fried sweet potatoes or cheese and crackers or buffalo dip with crackers   Beverages 40 ounces water, 1 cup coffee with milk and honey 1 cup black or green tea with milk and honey, either kombucha or probiotic \"pressed juice and sparkling water   Alcohol Intake " About 2-3 times per month will have 1-2 mixed drinks     Frequency of eating/taking out meals: About 1-2 times per week but if having a lot of sports for son then could be more.  Tends to have either salad or sandwich or burger with fries when eating out     Labs:  on 10/16/23: chol 206, LDL chol 123 and non HDL chol 139.    Last Comprehensive Metabolic Panel:  Glucose   Date Value Ref Range Status   10/16/2023 85 70 - 99 mg/dL Final     Creatinine   Date Value Ref Range Status   10/16/2023 0.86 0.51 - 0.95 mg/dL Final     GFR Estimate   Date Value Ref Range Status   10/16/2023 86 >60 mL/min/1.73m2 Final       CBC RESULTS:   Recent Labs   Lab Test 10/16/23  0751   WBC 4.8   RBC 4.28   HGB 13.0   HCT 39.2   MCV 92   MCH 30.4   MCHC 33.2   RDW 12.2          Pertinent Medications/vitamin and mineral supplements:     Current Outpatient Medications   Medication    escitalopram (LEXAPRO) 10 MG tablet    JUNEL 1.5/30 1.5-30 MG-MCG tablet    Prenatal Vit w/Si-Mcsiqdbdu-KP (PNV PO)     No current facility-administered medications for this visit.       Food Allergies: No known food allergies     Physical Activity: Walks daily for 2 miles, 1-2 times per week uses either palatine or takes a class and does weights    Nutrition Prescription: Recommended general healthful diet     Nutrition Intervention:    Nutrition Education/Counseling:  -Provided general healthful diet nutrition education with tips and suggestions.   -Reviewed the Plate method meal plan with food groups and some example foods in groups.   -Reviewed the difference between unsaturated and saturated fats with recommendation to aim for choosing unsaturated fat over saturated fats and discussed examples of both.  -Discussed adequate hydration/recommendations and encouraged pt to drink at least 48 oz-64 oz (6-8 cups) per day.   Answered patient's questions. Patient verbalized understanding of education provided. Provided pt with RD contact information and list  "of goals below.     Educational Materials Provided:  [facility] Daily Nutrition Plate method handout     Goals:    Aim for eating 3 balanced meals per day and snacks in between meals if going long stretches between meals and if hungry.    Can use the Plate method plan for general guidance on getting balanced meals and general portion sizes which is as follows:   Make half of your plate vegetables and fruit. (Aim for at least 1-2 cups or more of vegetables and/or fruit at each meal).     Make 1/4 of your plate lean protein sources at a meal (salmon/fish, skinless chicken/turkey breast, pork loin, lean cuts of beef, black or kidney or alejandra beans, tofu, edamame, tempeh, eggs, egg whites, lowfat cottage cheese, lowfat yogurt).   Make the other 1/4 of your plate whole grain starches/grains/starchy vegetables at meals. Some examples include quinoa, brown rice, wild rice, barley, whole grain tortilla or starchy vegetables (potatoes, sweet potatoes, winter squash, peas, corn).     Include some healthy/unsaturated fat servings at a meal (avocados, nut butters, nuts/seeds, olive oil, vegetable oils, flaxseed, braulio seeds).     *Note: Recommendation is to eat at least 2-3 serving per day of some good sources of calcium (such as lowfat cottage cheese, lowfat yogurt, lowfat milk, tofu, salmon, sardines, kale, spinach, soybeans, almonds or whey protein powder for example).    3.  Prepare/plan out some meals ahead of time.   --To make planning ahead easier, you can make extra servings at dinner, so you can have leftovers for lunch.   --Can buy vegetables already cut/shredded/washed to quickly put a salad together for a meal or to go along with a meal.  --can make extra servings of lean meat/chicken breast to add to your salad at a meal.   --can have frozen vegetables, that you can easily microwave to add to meals/to add extra servings to meals.  --[employer] (has many easy to prepare recipes under the \"Simple Factor\" " recipes (may be 3 ingredients or less, 10 ingredients or less, one pot dishes as some examples).    4.  Gradually increase the amount of fiber you eat to get to a goal of 25 grams-35 grams per day.    --When increasing fiber in diet, do so gradually and do not eat too much fiber all of a sudden and also try not to load up on a lot of fiber all at one meal.    --To increase fiber:   - choose whole grain/100% whole grain grains and whole grain bread, whole grain crackers, whole grain pastas.   - eat fruits and vegetables with skins.   - eat whole fresh or frozen vegetables and fruit not juices.   - can add some beans or peas into soup, salads, stews.   - can eat nuts as a snack or along with a meal.   - can sprinkle nuts /seeds onto salad   - can add some braulio seeds or flaxseeds into homemade smoothies/protein smoothies    5.  Drink at least 8 cups (64 oz) or more of fluids especially from water per day. You may need to drink more fluids especially with eating a higher fiber diet as fluids help your body process the fiber with less discomfort.    Nutrition Monitoring and Evaluation: Will monitor adherence to nutrition recommendations at future RD visits.     Further Medical Nutrition Therapy:  Annual visits    Patient was encouraged to call/contact RD with any further questions.                  Again, thank you for allowing me to participate in the care of your patient.      Sincerely,    Karla Gallardo RD

## 2023-10-16 NOTE — PROGRESS NOTES
Administered Flu and new covid Pfizer [id number] vaccine (see Immunizations in Chart Review). Patient tolerated well.        Parish Durant CMA at 12:44 PM on 10/16/2023

## 2023-10-16 NOTE — OUTPATIENT NURSE NOTE
10/16/23 1154   Fitness   Current Fitness Regimen:  Walks a few miles per day pretty much every day. Does some high intensity workouts with a friend on Thursday's each week. Does some Peloton biking in the winter months mostly.   Fitness Goals Wants to improve overall health. Wants to get back to a consistent workout routine in order to help make this happen.   Timeline   Recommended Activity this Week Keep daily walks in your schedule. Add in 1-2 cardio workouts on the Peloton this week. Add in 1 strength training workout this week as well on top of your HIT workout on Thursday.   Recommended Minutes per Day this Week 45 Min   Recommended Number of Days this Week 4 Per Day/Per Week   Recommended Activity this Month Keep daily walks in your schedule. Aim for 2-3 days of cardio workouts per week on the Peloton. Aim for 1-2 days of strength training per week. Keep doing your Thursday HIT workout.   Recommended Duration this Month 45 Min/Hrs   Recommended Frequency this Month 5 Per Day/Per Week   Recommended Activity the Nex 3 Months Keep daily walks in your schedule. Aim for 3-4 days of cardio per week, or about 120-150 minutes per week on average. Aim for 2-3 days of strength training per week on average including your Thursday HIT workout with your friend.   Recommended Duration the Nex 3 Months 45 Min/Hrs   Recommended Frequency the Nex 3 Months:  5 Per Day/Per Week   VO2 Max   VO2MAX:  32.9 ml/kg/min   VO2- max Percentile 40 %   Fitness Level Fair    Strength    Strength (Right):  75 ml/kg/min    Strength (Left) 63 ml/kg/min

## 2023-10-16 NOTE — PATIENT INSTRUCTIONS
"Deborah,    It was a pleasure getting to work with you today, and thank you for giving your best efforts during the exercise testing today. To summarize the main points of your appointment today, you scored a 32.9 ml/kg/min with your VO2 Max score, which ranks you at the 40th percentile in your age group. We did see that your legs were kind of the limiting factor in the test, and not necessarily your breathing/lungs and heart. So I feel your VO2 max is actually a touch higher than what you scored, probably about mid 30's. This would put you into the \"Fair\" category still, but it would put you in the 50-55th percentile or so.     Your hand  strength scores from today were 75 lbs and 63 lbs on your right and left hands respectively, which scores you at the 63rd and 52nd percentiles for your age group. So this puts you right above the average. So both test results put you right around a similar percentile score which is nice to see that you are balanced between the two, and there's not one score way better than the other. It is hard to score well on both your strength and endurance, and I'm happy to see these scores with your last few years of exercise workouts not being exactly what you want. That means you can improve these numbers and score even better. So don't be frustrated or disappointed!    Like we talked about today, for your strength training we want to keep the focus of these workouts full body and not just focusing on one body part per workout. So a split routine style is the best way to go. For these workouts, you want to focus on touching each body part in the workout because frequency is the best thing for strength training, and not just doing a ton of exercises for a certain body part only once per week. Remember, 2 days per week of strength training should be your focus. And if you can focus on getting 2-3 days per week in on average, that would be a great goal for you to target.    For some example " workouts, you could try something like this. These are quick and fast workout, only a handful of exercises. I would recommend doing 3-4 sets of each exercise, and staying between 8-12 reps per exercise. For the core/abs stuff, go by time such as 30-45 sec. Feel free to swap some exercises out for ones you like better, but here are the workouts below you could try:    Workout A:  Kettlebell Goblet Squats, Dumbbell Bench Press, Dumbbell Seated or Standing Arnold Press, Dumbbell Forward Lunges or Split Squats, Tricep Dips, Front Planks (for time), Atomic Crunches (for time)    Workout B:  Kettlebell Dead Lifts or Sumo Dead Lifts, Dumbbell Bent Over Back Row, Stability Ball Hamstring Curls, Resistance Band or Dumbbell Bicep Curls, Dumbbell Renegade Rows, Side Planks (for time), Butterfly situps (for time)    For your cardio workouts, remember we want to focus on doing 150 minutes per week, and over time shooting for about 180-240 minutes per week for your average. You want to probably aim for about 4-5 days per week of cardio in order to hit that number a little easier. Even if you can get a short 20-30 min workout in during the morning before your kid is up to get ready for school, this is better than nothing and a great way to start your day. Especially fasted cardio in the morning at Zone 1 and 2 heart rates (low/moderate intensity) can be a great way to train your body to increase its fat burning/oxidation capability. And remember, one day should be at a higher intensity with intervals for your cardio workouts. I like to call these zones Zone 3 and Zone 4. Your low/moderate cardio would be in Zone 1 and 2. I have your zones listed below:    Zone 1 and 2: 115-150 bpm  Zone 3: 151-165 bpm  Zone 4: 166-185 bpm    Some example workouts for these zones would be these:    Zone 1 and 2: Anywhere from 20-90 minutes of continuous steady-state cardio, keeping the heart rate low in the zone and letting it gradually drift up  over the course of the workout.    Zone 3: 10-15 min warmup, 4 sets of 5 min at zone 3 with 1:30 min recovery between intervals. After all intervals are completed, do a 5-10 min cooldown at an easy pace.      Zone 4: 10-15 min warmup, 8 sets of 1:30 min at zone 4 with 1:30 min recovery between intervals. After all intervals are completed, do a 5-10 min cooldown at an easy pace.    Please keep me posted Deborah if you have any further questions with anything else about your results, or about anything we talked about today regarding your exercise routine. Feel free to reach out to me anytime, even if it's just an update or if it's a question you have. I look forward to hopefully seeing you back with us here at [facility] in the near future. And hopefully to see you score even higher on both of your exercise testing scores too!    Jorge Luis Varma  Exercise Physiologist  [email or web address]

## 2023-10-16 NOTE — NURSING NOTE
AHA BP    1st   116/81  2nd  118/81  3rd   114/79    Average   116/80  Wilma Shah CMA 9:56 AM on 10/16/2023

## 2023-10-16 NOTE — LETTER
10/16/2023       RE: Deborah Wiseman  [address]  [facility] [address]     Dear Colleague,    Thank you for referring your patient, Deborah Wiseman, to the [facility]MAT[facility] [place] [facility]. Please see a copy of my visit note below.    [facility] Dermatology Note  Encounter Date: Oct 16, 2023  Office Visit     Dermatology Problem List:  #FBSE 10/16/23  1. 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 Paternal grandmother had psoriasis.   ____________________________________________    Assessment & Plan:     # History of skin eruption, strong possibility of psoriasis due to family history but not active on exam today  - Moisturize daily after shower  -Follow up with me or another provider if flaring.     # Skin cancer screening with multiple benign nevi, solar lentigines    - ABCDEs: Counseled ABCDEs of melanoma: Asymmetry, Border (irregularity), Color (not uniform, changes in color), Diameter (greater than 6 mm which is about the size of a pencil eraser), and Evolving (any changes in preexisting moles).  - Sun protection: Counseled SPF30+ sunscreen, UPF clothing, sun avoidance, tanning bed avoidance.    # Cherry angiomas and seborrheic keratoses,  Benign, reassurance given.      Procedures Performed:   None.    Follow-up: prn for new or changing lesions    Staff and Scribe:     I, Mary Alice Ulloa, am serving as a scribe to document services personally performed by Norma Tim PA-C based on data collection and the provider's statements to me.   Provider Disclosure:   The documentation recorded by the scribe accurately reflects the services I personally performed and the decisions made by me.    All risks, benefits and alternatives were discussed with patient.  Patient is in agreement and understands the assessment and  plan.  All questions were answered.  Sun Screen Education was given.   Return to Clinic  as needed.   Norma Tim PA-C   [facility]    ____________________________________________    CC: Derm Problem (Deborah is here for a skin check. She has a few moles in chest areas she is concerned about. She also thinks she has psoriasis on her abdomen, it is better now but she thinks its from stress.  )    HPI:  Ms. Deborah Wiseman is a(n) 42 year old female who presents today as a new patient for [facility] skin cancer screening.    Patient uses coconut oil as a moisturizer. Patient believes she has had psoriasis on her abdomen. Patient also claims her chest and back have been itchy at times but has improved within the last week. Patient is decent with sun protection, makes sure she puts it on her face, shoulders, and chest. Patient reports she tans easy.       Patient is otherwise feeling well, without additional skin concerns.     Labs Reviewed:  N/A    Physical Exam:  Vitals: There were no vitals taken for this visit.  SKIN: Full skin, which includes the head/face, both arms, chest, back, abdomen,both legs, genitalia and/or groin buttocks, digits and/or nails, was examined.  - Gutierrez skin type: III  - There are dome shaped bright red papules on the trunk and extremities.   - Multiple regular brown pigmented macules and papules are identified on the trunk and extremities.   - Scattered brown macules on sun exposed areas.  - There are waxy stuck on tan to brown papules on the trunk and extremities.   - There are hyperemic macules on the upper arm and thighs from previous eruptions.   - No other lesions of concern on areas examined.           Medications:  Current Outpatient Medications   Medication    escitalopram (LEXAPRO) 10 MG tablet    JUNEL 1.5/30 1.5-30 MG-MCG tablet    Prenatal Vit w/Kf-Lieuccolq-BI (PNV PO)     No current facility-administered medications for this visit.       Past Medical History:   There is no problem list on file for this patient.    Past Medical History:   Diagnosis Date    Abnormal Pap smear 4 yrs ago        CC No referring provider defined for this encounter. on close of this encounter.      Noram Tim PA-C

## 2023-10-16 NOTE — PATIENT INSTRUCTIONS
It was nice meeting you today. Below are the nutrition recommendations we discussed at your visit.    Please let me know if you have any additional questions.    Nutrition Recommendations    Aim for eating 3 balanced meals per day and snacks in between meals if going long stretches between meals and if hungry.    Can use the Plate method plan for general guidance on getting balanced meals and general portion sizes which is as follows:   Make half of your plate vegetables and fruit. (Aim for at least 1-2 cups or more of vegetables and/or fruit at each meal).     Make 1/4 of your plate lean protein sources at a meal (salmon/fish, skinless chicken/turkey breast, pork loin, lean cuts of beef, black or kidney or alejandra beans, tofu, edamame, tempeh, eggs, egg whites, lowfat cottage cheese, lowfat yogurt).   Make the other 1/4 of your plate whole grain starches/grains/starchy vegetables at meals. Some examples include quinoa, brown rice, wild rice, barley, whole grain tortilla or starchy vegetables (potatoes, sweet potatoes, winter squash, peas, corn).     Include some healthy/unsaturated fat servings at a meal (avocados, nut butters, nuts/seeds, olive oil, vegetable oils, flaxseed, braulio seeds).     *Note: Recommendation is to eat at least 2-3 serving per day of some good sources of calcium (such as lowfat cottage cheese, lowfat yogurt, lowfat milk, tofu, salmon, sardines, kale, spinach, soybeans, almonds or whey protein powder for example).    3.  Prepare/plan out some meals ahead of time.     --To make planning ahead easier, you can make extra servings at dinner, so you can have leftovers for lunch.     --Can buy vegetables already cut/shredded/washed to quickly put a salad together for a meal or to go along with a meal.    --can make extra servings of lean meat/chicken breast to add to your salad at a meal.     --can have frozen vegetables, that you can easily microwave to add to meals/to add extra servings to  "meals.    --[employer] (has many easy to prepare recipes under the \"Simple Factor\" recipes (may be 3 ingredients or less, 10 ingredients or less, one pot dishes as some examples).    4.  Gradually increase the amount of fiber you eat to get to a goal of 25 grams-35 grams per day.    --When increasing fiber in diet, do so gradually and do not eat too much fiber all of a sudden and also try not to load up on a lot of fiber all at one meal.    --To increase fiber:   - choose whole grain/100% whole grain grains and whole grain bread, whole grain crackers, whole grain pastas.   - eat fruits and vegetables with skins.   - eat whole fresh or frozen vegetables and fruit not juices.   - can add some beans or peas into soup, salads, stews.   - can eat nuts as a snack or along with a meal.   - can sprinkle nuts /seeds onto salad   - can add some braulio seeds or flaxseeds into homemade smoothies/protein smoothies    5.  Drink at least 8 cups (64 oz) or more of fluids especially from water per day. You may need to drink more fluids especially with eating a higher fiber diet as fluids help your body process the fiber with less discomfort.    Thank you,    Karla Gallardo, MS, RD, LD    "

## 2023-10-16 NOTE — PROGRESS NOTES
AUDIOLOGY REPORT    SUMMARY: Audiology visit completed. See audiogram for results.      RECOMMENDATIONS: Follow-up with Dr. Boateng. Return to monitor hearing if changes are noted or new ear symptoms arise.     Endy Larkin.  Licensed Audiologist  [place] # [id number]

## 2023-10-16 NOTE — LETTER
10/16/2023       RE: Deborah Wiseman  [address] [facility] [address]     Dear Colleague,    Thank you for referring your patient, Deborah Wiseman, to the [facility] at [facility]. Please see a copy of my visit note below.         History and Physical Examination     SUBJECTIVE: Chief concern: preventive health review.     Past Medical History:   1.   1, para 1, LC1 (son born by  in 2012).  2.  Remote history of abnormal Pap smear with multiple negative results since that time  3.  History of colonic polyps, ; 3-year follow-up recommended at that time.  4.  History of unspecified sacroiliac injury during high school after falling from the top of a cheerleading.  5.  Restless leg syndrome, mild.  6.  History of anxiety and depression, managed with escitalopram.  7.  History of bilateral anterior thigh paresthesias, with EMG apparently suggesting compression neuropathy      Adverse Drug Reactions:   Cephalexin (urticaria).  Erythromycin (abdominal discomfort)     Current Medications:  Magnesium supplement, one tablet daily  Vitamin D3 (25 mcg)/vitamin B12 supplement, one tablet daily   Escitalopram, 10 mg daily  Ethinyl estradiol and moethindrone, 1.5-30, 1 tablet daily     Habits:  Tobacco: Never.     Alcohol: 2 servings, 1-2 days per month  Caffeine:  1 serving of coffee or tea per day  Marijuana/Street drugs: None     Social History:  to [place] and mother of one child: Dev, age 11 (born in 2012), who enjoys basketball, baseball, skateboarding, and skiing.  Deborah is a native of [place] who spent one year in [place] with her family during middle school.  She graduated from the [employer], with a degree in marketing, since which time she has lived in the [place].  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  Away from work, she enjoys family activities, travel, reading, and visiting a lake cabin in [place], [place].  She exercises by walking outside 1-2 times per day, in addition to circuit training gym sessions once per week.    Family History: Mother is 70, with history of colonic polyps.  Father is in good health in his 70s.  Maternal grandmother  in a motor vehicle accident in her late 50s, with history of unspecified mental health and chemical dependency issues.  Maternal grandfather  at age 72, with history of chemical dependency and supranuclear palsy.  Paternal grandmother  at age 88, with history of hypertension.  Paternal grandfather  at age 95, with history of hypertension.  A brother 7.5 years her keyana has problems with chemical dependency.  Several maternal aunts and uncles had issues with chemical dependency.     Review of Systems: Intermittent stress, with no significant active symptoms of anxiety and depression.  History of sacroiliac injury during high school; over the past year, Deborah has noted persistent mild lower back pain, rated at 4-5/10 upon awakening, with improvement to 1-2/10 during the day, without full resolution.  She denies associated \"warning symptoms\", including unexplained fever or weight loss, leg weakness, difficulty with urination, and unrelenting nocturnal pain.  Long-standing fatigue, despite sleeping approximately 8.5 hours per night.  She notes middle insomnia twice per week when she will lie awake for 45-90 minutes.  She typically goes to bed at 10:30 and awakens at 7, with unrefreshed sensation upon awakening.  She is aware that she snores when lying in a supine position; she is not aware of apnea, but describes daytime somnolence on most days.  Previously diagnosed as having mild restless leg syndrome, with improvement noted following use of magnesium supplements.  Colonoscopy was completed in ; 3-year follow-up was recommended at that " time.  Pap and pelvic examinations were completed in 2021; she sees her gynecologist annually.  Most recent tetanus (Tdap) was administered in 2/2022.  Hepatitis A vaccination series was completed in 10/2014.  Covid (his Jansson) vaccination administered on 3/20/2021; Covid (Pfizer) vaccination was administered on 12/29/2021.  Typhoid IM administered in 10/2014.  Deborah is uncertain whether she has received hepatitis B vaccinations.  Remainder of complete review of systems was negative.     OBJECTIVE:     Vital signs: Height 68 inches.  Weight 144 pounds.  Blood pressure 116/80 on average of 3 automated readings.  Heart rate 77.  Respiratory rate 16.  Temperature 98.6 degrees.  O2 saturation 100% on room air.  General: Alert, neatly dressed and groomed, in no acute distress.  HEENT: Atraumatic and normocephalic. Eyelids, pupils, and conjunctivae appeared normal. Lips, teeth and gums appear normal.  Oropharynx showed moist mucous membranes, without exudate or erythema.  Neck: Supple, without thyromegaly, mass, or bruit. No cervical or supraclavicular lymphadenopathy.  Back: No spinal or costovertebral angle tenderness.  SLR negative to 60  bilaterally.  Lower extremity sensory and motor examinations were normal.  Heel and toe walks performed without difficulty.  Chest: Clear to auscultation and percussion. Normal respiratory effort.  Cardiovascular: No jugular venous distention. Regular rate and rhythm, normal S1, S2 without murmur.  Abdomen: Bowel sounds positive; soft, nontender, without rebound, guarding, hepatosplenomegaly or mass.  Extremities: No cyanosis or edema.  Breasts and pelvic: Examinations deferred; followed regularly by an outside gynecologist.  Skin: Examination was deferred; full evaluation was completed earlier in day through dermatology clinic.  Neurologic: Cranial nerves II-XII were grossly intact. Sensory and motor examinations were normal. Normal gait.  No tremor or cogwheel rigidity.  Mini-cog  "score was 5/5.  Psychiatric: Alert and oriented ×3. Normal affect. Judgment and insight intact.  AWAIS-7 score was 3.  PHQ-2 score was 0.    Creatinine 0.86, alkaline phosphatase 35, ALT 7, cholesterol 206, triglycerides 81, HDL 67, , cholesterol/HDL 3.1, glucose 85, TSH 1.88, 25-hydroxy vitamin D 50, white blood cell count 4800, hemoglobin 13.0, platelets 222,000, HIV and hepatitis C screens nonreactive.    EKG was unremarkable.  Spirometry showed an FEV1 of 3.00, with an FVC of 3.74; readings were 96% and 97% of predicted values, respectively.    An audiology evaluation showed normal hearing bilaterally.    Preliminary DEXA showed normal bone density.  Body composition analysis showed 33.8% fat (55th percentile); body mass index was 21.9.     ASSESSMENT:    1.  Fatigue.  Long-standing, with negative previous evaluation.  Normal TSH, CBC, and glucose levels.  Deborah indicates that she sleeps 8.5 hours per night on average, although she does note middle insomnia twice per week for periods lasting 45-90 minutes.  She reports that she snores when in the supine position and is uncertain how much of each night.  She spends in that position.  She reports daytime somnolence, without observed apnea.  She describes a history of restless leg syndrome with controlled symptoms at this time.  I recommended sleep clinic consultation for further evaluation of snoring and restless leg syndrome.  She was advised to pursue further evaluation if addressing any sleep issues does not lead to resolution of her fatigue.    2.  Lower back discomfort.  Deborah denies \"warning symptoms\" and current examination is unremarkable.  In this setting.  Long-standing symptoms, I recommended physical therapy evaluation and treatment, with plans to pursue further evaluation if symptoms do not fully resolve after 6 weeks of therapy.    3.  Personal and family history of colonic polyps.  Colonoscopy was completed in 2022 and will be due in " 2025.    4.  Elevated body fat percentage.  We discussed a plan to work on building muscle mass through additional strength training while reducing body fat percentage through modification of diet and exercise regimen.  I emphasized that she would not need to reduce weight, but rather change.  Body composition.    5.  Preventive care.  Pap and breast examinations completed through her gynecology clinic.  Colonoscopy was completed in 2022, as noted above.  Hepatitis B surface antibody will be measured to determine need for hepatitis B vaccination.  Otherwise, immunization status is up-to-date.  After administration of influenza and Covid vaccinations today.  We reviewed the elements of a healthful diet and the roles of various types of exercise.  We discussed the importance of maintaining bone health through regular weight-bearing exercise; use of supplemental vitamin D3, 50 mcg daily; and daily calcium intake of 1200 mg, preferably from dietary sources.     PLAN: See above.     ~SRT      Again, thank you for allowing me to participate in the care of your patient.      Sincerely,    Rene Boateng MD

## 2023-10-16 NOTE — NURSING NOTE
Chief Complaint   Patient presents with    Physical     Patient is here for annual physical     Wilma Shah CMA 8:06 AM on 10/16/2023

## 2023-10-16 NOTE — PROGRESS NOTES
[facility] Dermatology Note  Encounter Date: Oct 16, 2023  Office Visit     Dermatology Problem List:  #FBSE 10/16/23  1. 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 Paternal grandmother had psoriasis.   ____________________________________________    Assessment & Plan:     # History of skin eruption, strong possibility of psoriasis due to family history but not active on exam today  - Moisturize daily after shower  -Follow up with me or another provider if flaring.     # Skin cancer screening with multiple benign nevi, solar lentigines    - ABCDEs: Counseled ABCDEs of melanoma: Asymmetry, Border (irregularity), Color (not uniform, changes in color), Diameter (greater than 6 mm which is about the size of a pencil eraser), and Evolving (any changes in preexisting moles).  - Sun protection: Counseled SPF30+ sunscreen, UPF clothing, sun avoidance, tanning bed avoidance.    # Cherry angiomas and seborrheic keratoses,  Benign, reassurance given.      Procedures Performed:   None.    Follow-up: prn for new or changing lesions    Staff and Scribe:     I, Mary Alice Ulloa, am serving as a scribe to document services personally performed by Norma Tim PA-C based on data collection and the provider's statements to me.   Provider Disclosure:   The documentation recorded by the scribe accurately reflects the services I personally performed and the decisions made by me.    All risks, benefits and alternatives were discussed with patient.  Patient is in agreement and understands the assessment and plan.  All questions were answered.  Sun Screen Education was given.   Return to Clinic  as needed.   Norma Tim PA-C   [facility]    ____________________________________________    CC: Derm Problem (Deborah is here for a skin check. She has a few moles in chest areas  she is concerned about. She also thinks she has psoriasis on her abdomen, it is better now but she thinks its from stress.  )    HPI:  Ms. Deborah Wiseman is a(n) 42 year old female who presents today as a new patient for [facility] skin cancer screening.    Patient uses coconut oil as a moisturizer. Patient believes she has had psoriasis on her abdomen. Patient also claims her chest and back have been itchy at times but has improved within the last week. Patient is decent with sun protection, makes sure she puts it on her face, shoulders, and chest. Patient reports she tans easy.       Patient is otherwise feeling well, without additional skin concerns.     Labs Reviewed:  N/A    Physical Exam:  Vitals: There were no vitals taken for this visit.  SKIN: Full skin, which includes the head/face, both arms, chest, back, abdomen,both legs, genitalia and/or groin buttocks, digits and/or nails, was examined.  - Gutierrez skin type: III  - There are dome shaped bright red papules on the trunk and extremities.   - Multiple regular brown pigmented macules and papules are identified on the trunk and extremities.   - Scattered brown macules on sun exposed areas.  - There are waxy stuck on tan to brown papules on the trunk and extremities.   - There are hyperemic macules on the upper arm and thighs from previous eruptions.   - No other lesions of concern on areas examined.           Medications:  Current Outpatient Medications   Medication    escitalopram (LEXAPRO) 10 MG tablet    JUNEL 1.5/30 1.5-30 MG-MCG tablet    Prenatal Vit w/Nt-Hnbqwnzkz-HL (PNV PO)     No current facility-administered medications for this visit.      Past Medical History:   There is no problem list on file for this patient.    Past Medical History:   Diagnosis Date    Abnormal Pap smear 4 yrs ago        CC No referring provider defined for this encounter. on close of this encounter.

## 2023-10-16 NOTE — PROGRESS NOTES
History and Physical Examination     SUBJECTIVE: Chief concern: preventive health review.     Past Medical History:   1.   1, para 1, LC1 (son born by  in 2012).  2.  Remote history of abnormal Pap smear with multiple negative results since that time  3.  History of colonic polyps, ; 3-year follow-up recommended at that time.  4.  History of unspecified sacroiliac injury during high school after falling from the top of a cheerleading.  5.  Restless leg syndrome, mild.  6.  History of anxiety and depression, managed with escitalopram.  7.  History of bilateral anterior thigh paresthesias, with EMG apparently suggesting compression neuropathy      Adverse Drug Reactions:   Cephalexin (urticaria).  Erythromycin (abdominal discomfort)     Current Medications:  Magnesium supplement, one tablet daily  Vitamin D3 (25 mcg)/vitamin B12 supplement, one tablet daily   Escitalopram, 10 mg daily  Ethinyl estradiol and moethindrone, 1.5-30, 1 tablet daily     Habits:  Tobacco: Never.     Alcohol: 2 servings, 1-2 days per month  Caffeine:  1 serving of coffee or tea per day  Marijuana/Street drugs: None     Social History:  to [place] and mother of one child: Dev, age 11 (born in 2012), who enjoys basketball, baseball, skateboarding, and skiing.  Deborah is a native of [place] who spent one year in [place] with her family during middle school.  She graduated from the [employer], with a degree in marketing, since which time she has lived in the [place].  She has worked for [employer] since , currently in the area of store operations, involved in building and remodeling stores.  Away from work, she enjoys family activities, travel, reading, and visiting a [facility] in [place], [place].  She exercises by walking outside 1-2 times per day, in addition to circuit training gym sessions once per week.    Family History: Mother is 70, with history of colonic polyps.  Father is in  "good health in his 70s.  Maternal grandmother  in a motor vehicle accident in her late 50s, with history of unspecified mental health and chemical dependency issues.  Maternal grandfather  at age 72, with history of chemical dependency and supranuclear palsy.  Paternal grandmother  at age 88, with history of hypertension.  Paternal grandfather  at age 95, with history of hypertension.  A brother 7.5 years her keyana has problems with chemical dependency.  Several maternal aunts and uncles had issues with chemical dependency.     Review of Systems: Intermittent stress, with no significant active symptoms of anxiety and depression.  History of sacroiliac injury during high school; over the past year, Deborah has noted persistent mild lower back pain, rated at 4-5/10 upon awakening, with improvement to 1-2/10 during the day, without full resolution.  She denies associated \"warning symptoms\", including unexplained fever or weight loss, leg weakness, difficulty with urination, and unrelenting nocturnal pain.  Long-standing fatigue, despite sleeping approximately 8.5 hours per night.  She notes middle insomnia twice per week when she will lie awake for 45-90 minutes.  She typically goes to bed at 10:30 and awakens at 7, with unrefreshed sensation upon awakening.  She is aware that she snores when lying in a supine position; she is not aware of apnea, but describes daytime somnolence on most days.  Previously diagnosed as having mild restless leg syndrome, with improvement noted following use of magnesium supplements.  Colonoscopy was completed in ; 3-year follow-up was recommended at that time.  Pap and pelvic examinations were completed in ; she sees her gynecologist annually.  Most recent tetanus (Tdap) was administered in 2022.  Hepatitis A vaccination series was completed in 10/2014.  Covid (Moveline) vaccination administered on 3/20/2021; Covid (Pfizer) vaccination was administered on " 12/29/2021.  Typhoid IM administered in 10/2014.  Deborah is uncertain whether she has received hepatitis B vaccinations.  Remainder of complete review of systems was negative.     OBJECTIVE:     Vital signs: Height 68 inches.  Weight 144 pounds.  Blood pressure 116/80 on average of 3 automated readings.  Heart rate 77.  Respiratory rate 16.  Temperature 98.6 degrees.  O2 saturation 100% on room air.  General: Alert, neatly dressed and groomed, in no acute distress.  HEENT: Atraumatic and normocephalic. Eyelids, pupils, and conjunctivae appeared normal. Lips, teeth and gums appear normal.  Oropharynx showed moist mucous membranes, without exudate or erythema.  Neck: Supple, without thyromegaly, mass, or bruit. No cervical or supraclavicular lymphadenopathy.  Back: No spinal or costovertebral angle tenderness.  SLR negative to 60  bilaterally.  Lower extremity sensory and motor examinations were normal.  Heel and toe walks performed without difficulty.  Chest: Clear to auscultation and percussion. Normal respiratory effort.  Cardiovascular: No jugular venous distention. Regular rate and rhythm, normal S1, S2 without murmur.  Abdomen: Bowel sounds positive; soft, nontender, without rebound, guarding, hepatosplenomegaly or mass.  Extremities: No cyanosis or edema.  Breasts and pelvic: Examinations deferred; followed regularly by an outside gynecologist.  Skin: Examination was deferred; full evaluation was completed earlier in day through dermatology clinic.  Neurologic: Cranial nerves II-XII were grossly intact. Sensory and motor examinations were normal. Normal gait.  No tremor or cogwheel rigidity.  Mini-cog score was 5/5.  Psychiatric: Alert and oriented ×3. Normal affect. Judgment and insight intact.  AWAIS-7 score was 3.  PHQ-2 score was 0.    Creatinine 0.86, alkaline phosphatase 35, ALT 7, cholesterol 206, triglycerides 81, HDL 67, , cholesterol/HDL 3.1, glucose 85, TSH 1.88, 25-hydroxy vitamin D 50, white  "blood cell count 4800, hemoglobin 13.0, platelets 222,000, HIV and hepatitis C screens nonreactive.    EKG was unremarkable.  Spirometry showed an FEV1 of 3.00, with an FVC of 3.74; readings were 96% and 97% of predicted values, respectively.    An audiology evaluation showed normal hearing bilaterally.    Preliminary DEXA showed normal bone density.  Body composition analysis showed 33.8% fat (55th percentile); body mass index was 21.9.     ASSESSMENT:    1.  Fatigue.  Long-standing, with negative previous evaluation.  Normal TSH, CBC, and glucose levels.  Deborah indicates that she sleeps 8.5 hours per night on average, although she does note middle insomnia twice per week for periods lasting 45-90 minutes.  She reports that she snores when in the supine position and is uncertain how much of each night.  She spends in that position.  She reports daytime somnolence, without observed apnea.  She describes a history of restless leg syndrome with controlled symptoms at this time.  I recommended sleep clinic consultation for further evaluation of snoring and restless leg syndrome.  She was advised to pursue further evaluation if addressing any sleep issues does not lead to resolution of her fatigue.    2.  Lower back discomfort.  Deborah denies \"warning symptoms\" and current examination is unremarkable.  In this setting.  Long-standing symptoms, I recommended physical therapy evaluation and treatment, with plans to pursue further evaluation if symptoms do not fully resolve after 6 weeks of therapy.    3.  Personal and family history of colonic polyps.  Colonoscopy was completed in 2022 and will be due in 2025.    4.  Elevated body fat percentage.  We discussed a plan to work on building muscle mass through additional strength training while reducing body fat percentage through modification of diet and exercise regimen.  I emphasized that she would not need to reduce weight, but rather change.  Body composition.    5.  " Preventive care.  Using AHA/ACC guidelines, estimated 10-year risk of a vascular event is 0.4%, the optimal level for her age/gender cohort.  Pap and breast examinations completed through her gynecology clinic.  Colonoscopy was completed in 2022, as noted above.  Hepatitis B surface antibody will be measured to determine need for hepatitis B vaccination.  Otherwise, immunization status is up-to-date.  After administration of influenza and Covid vaccinations today.  We reviewed the elements of a healthful diet and the roles of various types of exercise.  We discussed the importance of maintaining bone health through regular weight-bearing exercise; use of supplemental vitamin D3, 50 mcg daily; and daily calcium intake of 1200 mg, preferably from dietary sources.     PLAN: See above.     ~SRT

## 2023-10-16 NOTE — NURSING NOTE
Chief Complaints and History of Present Illnesses   Patient presents with    Annual Eye Exam     Chief Complaint(s) and History of Present Illness(es)       Annual Eye Exam              Associated symptoms: Negative for redness, jaw claudication, photophobia and foreign body sensation    Pain scale: 0/10              Comments    Patient present for annual exam. Patient hs no complaints at this time. Patient wearing contact lenses. She likes vision comfort amd fit.  ANA ROSA Schaeffer October 16, 2023 8:26 AM

## 2023-10-17 LAB
EXPTIME-PRE: 6.19 SEC
FEF2575-%PRED-PRE: 87 %
FEF2575-PRE: 2.75 L/SEC
FEF2575-PRED: 3.16 L/SEC
FEFMAX-%PRED-PRE: 97 %
FEFMAX-PRE: 7.34 L/SEC
FEFMAX-PRED: 7.56 L/SEC
FEV1-%PRED-PRE: 96 %
FEV1-PRE: 3 L
FEV1FEV6-PRE: 80 %
FEV1FEV6-PRED: 83 %
FEV1FVC-PRE: 80 %
FEV1FVC-PRED: 82 %
FIFMAX-PRE: 3.63 L/SEC
FVC-%PRED-PRE: 97 %
FVC-PRE: 3.74 L
FVC-PRED: 3.83 L
HBV SURFACE AB SERPL IA-ACNC: 53.3 M[IU]/ML
HBV SURFACE AB SERPL IA-ACNC: REACTIVE M[IU]/ML

## 2024-02-04 ENCOUNTER — HEALTH MAINTENANCE LETTER (OUTPATIENT)
Age: 44
End: 2024-02-04

## 2025-01-04 ENCOUNTER — HEALTH MAINTENANCE LETTER (OUTPATIENT)
Age: 45
End: 2025-01-04